# Patient Record
Sex: MALE | Race: WHITE | Employment: FULL TIME | URBAN - METROPOLITAN AREA
[De-identification: names, ages, dates, MRNs, and addresses within clinical notes are randomized per-mention and may not be internally consistent; named-entity substitution may affect disease eponyms.]

---

## 2021-03-05 ENCOUNTER — TELEPHONE (OUTPATIENT)
Dept: ORTHOPEDICS | Facility: CLINIC | Age: 36
End: 2021-03-05

## 2021-03-05 NOTE — TELEPHONE ENCOUNTER
Rec'd an email from Patient, requesting to see  for his knee problems.    No patient information on the incoming email.  No  or address.      Emailed pt back to contact orthopedic  to schedule or send email back w/, address and scheduling preferences

## 2021-06-02 ENCOUNTER — TELEPHONE (OUTPATIENT)
Dept: ORTHOPEDICS | Facility: CLINIC | Age: 36
End: 2021-06-02

## 2021-06-02 NOTE — TELEPHONE ENCOUNTER
Rec'd an email from patient stating that he rec'd a letter that said he needs to r/s his appointment with  on 7/13/21.  Writer is unsure why patient needs to reschedule.  Please contact patient via email and let him know the circumstances.

## 2021-06-03 NOTE — TELEPHONE ENCOUNTER
Patient was contacted via email that he can keep his appointment as is.      He was was also asked to provider where is records are located so that the clinic can gather his information before the appointment.

## 2021-06-07 NOTE — TELEPHONE ENCOUNTER
"Patient provider the following information.      \"Have you seen a provider for your left knee patellar issues before?  If yes:  Who have you seen and where did you see them? Dr. Kee with [facility].  Do you have any imaging on your knees? Not recently, this needs to be done.  Have you had surgery before on your knees? Yes, twice, with Dr. Kee, roughly 2000 and 2003.\"    He needed to reschedule his appointment and was offered and accepted an appointment with Dr. Glaser on 6/21/21  "

## 2021-06-16 NOTE — TELEPHONE ENCOUNTER
DIAGNOSIS: I have some challenges with my left knee and long term patella issues   APPOINTMENT DATE: 6.21.21   NOTES STATUS DETAILS   OFFICE NOTE from referring provider N/A    OFFICE NOTE from other specialist Sent to [facility] 6/24 8:17 AM [facility]   DISCHARGE SUMMARY from hospital N/A    DISCHARGE REPORT from the ER N/A    OPERATIVE REPORT In process 2003 2000   EMG report N/A    MEDICATION LIST N/A    MRI N/A    DEXA (osteoporosis/bone health) N/A    CT SCAN N/A    XRAYS (IMAGES & REPORTS) N/A      Action 6.17.21 9:57 AM [facility]   Action Taken Sent BILLIE to pt email at [email or web address]      Action 6.21.21 1:52 PM [facility]   Action Taken Brought RI to 4N for Debby to have pt sign. Sent BILLIE to [facility] [phone]

## 2021-06-18 DIAGNOSIS — M25.562 LEFT KNEE PAIN, UNSPECIFIED CHRONICITY: Primary | ICD-10-CM

## 2021-06-21 ENCOUNTER — PRE VISIT (OUTPATIENT)
Dept: ORTHOPEDICS | Facility: CLINIC | Age: 36
End: 2021-06-21

## 2021-06-21 ENCOUNTER — ANCILLARY PROCEDURE (OUTPATIENT)
Dept: GENERAL RADIOLOGY | Facility: CLINIC | Age: 36
End: 2021-06-21
Payer: COMMERCIAL

## 2021-06-21 ENCOUNTER — OFFICE VISIT (OUTPATIENT)
Dept: ORTHOPEDICS | Facility: CLINIC | Age: 36
End: 2021-06-21
Payer: COMMERCIAL

## 2021-06-21 DIAGNOSIS — M25.562 LEFT KNEE PAIN, UNSPECIFIED CHRONICITY: ICD-10-CM

## 2021-06-21 DIAGNOSIS — M25.362 PATELLAR INSTABILITY OF LEFT KNEE: Primary | ICD-10-CM

## 2021-06-21 PROCEDURE — 73562 X-RAY EXAM OF KNEE 3: CPT | Mod: LT | Performed by: RADIOLOGY

## 2021-06-21 PROCEDURE — 99204 OFFICE O/P NEW MOD 45 MIN: CPT | Mod: GC | Performed by: ORTHOPAEDIC SURGERY

## 2021-06-21 RX ORDER — FAMOTIDINE 20 MG/1
20 TABLET, FILM COATED ORAL DAILY
COMMUNITY
Start: 2020-11-24

## 2021-06-21 RX ORDER — ATORVASTATIN CALCIUM 20 MG/1
20 TABLET, FILM COATED ORAL AT BEDTIME
COMMUNITY
Start: 2021-03-14

## 2021-06-21 NOTE — PROGRESS NOTES
Patient seen and examined with the resident.     Assesment: patellar instability left knee, recurrent, 2 prior arthroscopic surgeries    Plan: mri of knee, f/u after    I agree with history, physical and imaging as well as the assessment and plan as detailed by Dr. Draper.

## 2021-06-21 NOTE — LETTER
Date:June 22, 2021      Patient was self referred, no letter generated. Do not send.        [facility] Health Information

## 2021-06-21 NOTE — PROGRESS NOTES
CHIEF CONCERN:   Chief Complaint   Patient presents with     Consult     left knee        HISTORY:   Marcelo Clark is a 36 year old male who presents to clinic today for evaluation of left knee pain. Pt states that he has had left patella instability in the past. He has had 6-7 instability episodes. His sister had surgery for this as well. He has had surgery to th left knee many years ago, he cannot recall the procedure. He most recently dislocated a few months ago playing golf. He is here to discuss surgical treatments.      PAST MEDICAL HISTORY: (Reviewed with the patient and in the [facility] medical record)  No past medical history on file.    PAST SURGICAL HISTORY: (Reviewed with the patient and in the [facility] medical record)  No past surgical history on file.    MEDICATIONS: (Reviewed with the patient and in the [facility] medical record)  Notable medications include:  Current Outpatient Medications   Medication Sig Dispense Refill     atorvastatin (LIPITOR) 20 MG tablet Take 20 mg by mouth At Bedtime       famotidine (PEPCID) 20 MG tablet Take 20 mg by mouth daily          ALLERGIES: (Reviewed with the patient and in the [facility] medical record)  No Known Allergies    SOCIAL HISTORY: (Reviewed with the patient and in the medical record)  Relationships   Social connections     Talks on phone: Not on file     Gets together: Not on file     Attends Moravian service: Not on file     Active member of club or organization: Not on file     Attends meetings of clubs or organizations: Not on file     Relationship status: Not on file        FAMILY HISTORY: (Reviewed with the patient and in the medical record)  -- No family history of bleeding, clotting, or difficulty with anesthesia    REVIEW OF SYSTEMS: (Reviewed with the patient and on the health intake form)  -- A comprehensive 10 point review of systems was conducted and is negative except as noted in the HPI    EXAM:   General: Awake, Alert and Oriented, No acute Distress.  Articulate and Interactive  There were no vitals taken for this visit.     Left lower extremity and knee exam:    Skin is Warm and Well perfused, no suggestion of infection    No effusion. No medial joint line pain. No lateral joint line pain. No other tenderness to palpation    Full range of motion, negative McMurrays    Stable to varus and valgus stress    Negative Lachmans, negative posterior drawer, negative pivot shift    Negative patellar tilt, negative patellar laxity, no J-sign present, + apprehension    Neurovascularly intact distally    IMAGING:  Plain Radiographs: Radiographs of the left knee from 6/21/2021 were independently reviewed by me and findings were discussed with the patient today. The imaging demonstrates possible trochlear dysplasia with apparent bump.    ASSESSMENT:  1. 36 year old male with left patella instability    PLAN:  1. The above diagnosis was discussed. he demonstrated understanding.  2. MRI for left knee  3. F/u after MRI    Jairon Draper MD   Fellow, Sports Medicine & Shoulder  [facility]

## 2021-06-21 NOTE — NURSING NOTE
Reason For Visit:   Chief Complaint   Patient presents with     Consult     left knee         ?  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  Work status?  Full time.  Date of injury: Chronic   Type of injury: Dislocation of patella.  Date of surgery: [facility] in [facility]   Type of surgery: .  Smoker: No  Request smoking cessation information: No         There were no vitals taken for this visit.       No Known Allergies    Current Outpatient Medications   Medication     atorvastatin (LIPITOR) 20 MG tablet     famotidine (PEPCID) 20 MG tablet     No current facility-administered medications for this visit.          Debby Juarez, ATC

## 2021-06-21 NOTE — LETTER
6/21/2021         RE: Marcelo Clark  [address] [address]        Dear Colleague,    Thank you for referring your patient, Marcelo Clark, to the [facility] [place]. Please see a copy of my visit note below.    CHIEF CONCERN:   Chief Complaint   Patient presents with     Consult     left knee        HISTORY:   Marcelo Clark is a 36 year old male who presents to clinic today for evaluation of left knee pain. Pt states that he has had left patella instability in the past. He has had 6-7 instability episodes. His sister had surgery for this as well. He has had surgery to th left knee many years ago, he cannot recall the procedure. He most recently dislocated a few months ago playing golf. He is here to discuss surgical treatments.      PAST MEDICAL HISTORY: (Reviewed with the patient and in the [facility] medical record)  No past medical history on file.    PAST SURGICAL HISTORY: (Reviewed with the patient and in the [facility] medical record)  No past surgical history on file.    MEDICATIONS: (Reviewed with the patient and in the [facility] medical record)  Notable medications include:  Current Outpatient Medications   Medication Sig Dispense Refill     atorvastatin (LIPITOR) 20 MG tablet Take 20 mg by mouth At Bedtime       famotidine (PEPCID) 20 MG tablet Take 20 mg by mouth daily          ALLERGIES: (Reviewed with the patient and in the [facility] medical record)  No Known Allergies    SOCIAL HISTORY: (Reviewed with the patient and in the medical record)  Relationships   Social connections     Talks on phone: Not on file     Gets together: Not on file     Attends Shinto service: Not on file     Active member of club or organization: Not on file     Attends meetings of clubs or organizations: Not on file     Relationship status: Not on file        FAMILY HISTORY: (Reviewed with the patient and in the medical record)  -- No family history of bleeding, clotting, or difficulty  with anesthesia    REVIEW OF SYSTEMS: (Reviewed with the patient and on the health intake form)  -- A comprehensive 10 point review of systems was conducted and is negative except as noted in the HPI    EXAM:   General: Awake, Alert and Oriented, No acute Distress. Articulate and Interactive  There were no vitals taken for this visit.     Left lower extremity and knee exam:    Skin is Warm and Well perfused, no suggestion of infection    No effusion. No medial joint line pain. No lateral joint line pain. No other tenderness to palpation    Full range of motion, negative McMurrays    Stable to varus and valgus stress    Negative Lachmans, negative posterior drawer, negative pivot shift    Negative patellar tilt, negative patellar laxity, no J-sign present, + apprehension    Neurovascularly intact distally    IMAGING:  Plain Radiographs: Radiographs of the left knee from 6/21/2021 were independently reviewed by me and findings were discussed with the patient today. The imaging demonstrates possible trochlear dysplasia with apparent bump.    ASSESSMENT:  1. 36 year old male with left patella instability    PLAN:  1. The above diagnosis was discussed. he demonstrated understanding.  2. MRI for left knee  3. F/u after MRI    Jairon Draper MD   Fellow, Sports Medicine & Shoulder  [facility]opaedic Surgery     Patient seen and examined with the resident.     Assesment: patellar instability left knee, recurrent, 2 prior arthroscopic surgeries    Plan: mri of knee, f/u after    I agree with history, physical and imaging as well as the assessment and plan as detailed by Dr. Draper.         Again, thank you for allowing me to participate in the care of your patient.        Sincerely,        Marcelo Glaser MD

## 2021-07-02 ENCOUNTER — TELEPHONE (OUTPATIENT)
Dept: ORTHOPEDICS | Facility: CLINIC | Age: 36
End: 2021-07-02

## 2021-07-02 DIAGNOSIS — M25.362 PATELLAR INSTABILITY OF LEFT KNEE: Primary | ICD-10-CM

## 2021-07-02 RX ORDER — DICLOFENAC SODIUM 75 MG/1
75 TABLET, DELAYED RELEASE ORAL 2 TIMES DAILY
Qty: 28 TABLET | Refills: 0 | Status: SHIPPED | OUTPATIENT
Start: 2021-07-02

## 2021-07-02 NOTE — TELEPHONE ENCOUNTER
Marcelo Glaser MD Wobschall, Tricia A, MA; Diana Godwin, BEST; Elaine Peters, ATC; Lori Woodard, ATC             Please reach out to the patient and explain that his MRI has been rejected by his insurance company because he has not completed the reasons listed below.     We will not be able to get around this restriction, so Please ask the patient if he has done PT or been under the care of a physician x 6 weeks. If yes, then I will do a peer to peer. If no, then he needs to complete first -     Please order PT as well as PO diclofenac 75 mg po bid x 2 weeks, then prn. Then arrange a followup visit at 6 weeks (ok for phone) to discuss the results.                 Pt was called called by RN and voicemail left regarding Dr Glaser's above plan.  Rx for diclofenac was sent to pt's pharmacy on record.  P.T order was entered.

## 2021-07-05 ENCOUNTER — TELEPHONE (OUTPATIENT)
Dept: ORTHOPEDICS | Facility: CLINIC | Age: 36
End: 2021-07-05

## 2021-07-05 NOTE — TELEPHONE ENCOUNTER
[facility] Call Center    Phone Message    May a detailed message be left on voicemail: yes     Reason for Call: Other: Pt is returning call from Dr Glaser's team. Previously, pt's last PT was in 2004, and pt believes that PT will not help but instead will cause more pain.   Please call back to discuss.    Action Taken: Message routed to:  [facility] ortho    Travel Screening: Not Applicable

## 2021-07-05 NOTE — TELEPHONE ENCOUNTER
I left a message for the patient regarding his MRI denial. I let him know that he will need to complete 6 weeks of PT prior to getting the MRI and if he has completed this somewhere outside of our system to please call us back to let us know. Otherwise a PT order has been placed and he can start that at anytime. A call back number was given.     LUPILLO Henry

## 2021-07-05 NOTE — TELEPHONE ENCOUNTER
Writer called and talked with pt on the phone. Pt states that prior to switching care over to [facility] another MRI had been ordered and approved, so pt does not understand why would need to do physical therapy. Writer informed pt that they should call their medical insurance to see why MRI is denied and if physical therapy is necessary and or if would be covered. Patient will call clinic back once as an answer.     Radha Purcell LPN

## 2021-07-11 ENCOUNTER — HEALTH MAINTENANCE LETTER (OUTPATIENT)
Age: 36
End: 2021-07-11

## 2021-09-05 ENCOUNTER — HEALTH MAINTENANCE LETTER (OUTPATIENT)
Age: 36
End: 2021-09-05

## 2021-10-18 ENCOUNTER — MYC MEDICAL ADVICE (OUTPATIENT)
Dept: ORTHOPEDICS | Facility: CLINIC | Age: 36
End: 2021-10-18
Payer: COMMERCIAL

## 2021-12-02 ENCOUNTER — ANCILLARY PROCEDURE (OUTPATIENT)
Dept: MRI IMAGING | Facility: CLINIC | Age: 36
End: 2021-12-02
Attending: ORTHOPAEDIC SURGERY
Payer: COMMERCIAL

## 2021-12-02 DIAGNOSIS — M25.362 PATELLAR INSTABILITY OF LEFT KNEE: ICD-10-CM

## 2021-12-02 PROCEDURE — 73721 MRI JNT OF LWR EXTRE W/O DYE: CPT | Mod: LT | Performed by: RADIOLOGY

## 2021-12-13 ENCOUNTER — OFFICE VISIT (OUTPATIENT)
Dept: ORTHOPEDICS | Facility: CLINIC | Age: 36
End: 2021-12-13
Payer: COMMERCIAL

## 2021-12-13 VITALS — WEIGHT: 215 LBS | BODY MASS INDEX: 29.12 KG/M2 | HEIGHT: 72 IN

## 2021-12-13 DIAGNOSIS — G89.29 CHRONIC PAIN OF LEFT KNEE: Primary | ICD-10-CM

## 2021-12-13 DIAGNOSIS — M25.562 CHRONIC PAIN OF LEFT KNEE: Primary | ICD-10-CM

## 2021-12-13 PROCEDURE — 99213 OFFICE O/P EST LOW 20 MIN: CPT | Mod: GC | Performed by: ORTHOPAEDIC SURGERY

## 2021-12-13 ASSESSMENT — MIFFLIN-ST. JEOR: SCORE: 1933.23

## 2021-12-13 NOTE — LETTER
Date:December 15, 2021      Patient was self referred, no letter generated. Do not send.        [facility] Health Information

## 2021-12-13 NOTE — PROGRESS NOTES
Patient seen and examined with the Fellow.     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  Improved following her visit in June of this year    Plan: Long discussion with the patient.  I showed him his images.  I discussed his underlying diagnosis of trochlear dysplasia and how this contributes to patellar instability.  I also discussed zee with him his new diagnosis of patellofemoral arthritis.  I discussed with him the treatment options including nonsurgical and surgical treatment.  We reviewed the potential expected course of recovery.  At this time he is actually feeling much better.  He is in a continue to do things that he wants to do.  Keep his muscle strong, keep his joints moving.  He has an offer for us for corticosteroid injection whenever needed.  He also has an offer for a prescription strength anti-inflammatory as necessary.  He will follow-up with me on an as-needed basis.    I agree with history, physical and imaging as well as the assessment and plan as detailed by Dr. Villalobos.

## 2021-12-13 NOTE — PROGRESS NOTES
DIAGNOSIS:   1. Left knee pain/instability    PROCEDURES:  1. None with us; two prior left knee arthroscopic procedures 20+ years prior.  Patient believes these were clean up type procedures.    HISTORY:  Briefly, this is a 36 year old male with a history significant for multiple (7+) episodes of patellar instability.  Since his last visit he worked with PT and has been working on strengthening on his own with the [employer].  He has not had an instability episode in over a year.  He has not had significant pain lately.  Admittedly, he has not played hockey or gone skiing as it just recently snowed for the first time.    Of note his sister also sustained multiple patellar dislocations and underwent two surgeries with Dr. Austin in 2019.    EXAM:     General: Awake, Alert, and oriented. Articulates and communicates with a normal affect     Left Lower Extremity:    Incisions well healed without evidence of infection    Stable with Lachman, anterior and posterior drawer    Negative Carlos's    Good range of motion, 0-135    Mild patellar apprehension with lateral translation    Good strength with straight leg testing    Neurovascularly intact    IMAGING:  MRI left knee without contrast obtained on 12/2/21 and reviewed today 12/13/21 demonstrates:  -Slight patella ho  -Trochlear dysplasia  -Questionably increased TTTG at 19-20  -Chondral lesion on patella and lateral trochlea with underlying cyst formation    ASSESSMENT:  1. Left knee patellar instability with aforementioned risk factors  2. Chondral lesions on patella and lateral trochlea    PLAN:   We discussed the diagnosis, prognosis, and treatment options.  We discussed non-operative versus operative intervention.    Non-operative intervention would consist of continued strengthening, injections, over the counter or prescription strength medications.    We discussed that he would also be a candidate for operative intervention.  However, as he is doing fairly well  with regard to symptoms, he would like to continue with non-operative management at this time.  He understands the treatment options as well as the diagnosis.  All of his questions were answered.  He will follow up on an as needed basis.    The patient was seen and examined with Dr. Glaser, who agrees with the assessment and plan as above.

## 2021-12-13 NOTE — LETTER
12/13/2021         RE: Marcelo Clark  [address] [address]        Dear Colleague,    Thank you for referring your patient, Marcelo Clark, to the [facility] [place]. Please see a copy of my visit note below.    Patient seen and examined with the Fellow.     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  Improved following her visit in June of this year    Plan: Long discussion with the patient.  I showed him his images.  I discussed his underlying diagnosis of trochlear dysplasia and how this contributes to patellar instability.  I also discussed zee with him his new diagnosis of patellofemoral arthritis.  I discussed with him the treatment options including nonsurgical and surgical treatment.  We reviewed the potential expected course of recovery.  At this time he is actually feeling much better.  He is in a continue to do things that he wants to do.  Keep his muscle strong, keep his joints moving.  He has an offer for us for corticosteroid injection whenever needed.  He also has an offer for a prescription strength anti-inflammatory as necessary.  He will follow-up with me on an as-needed basis.    I agree with history, physical and imaging as well as the assessment and plan as detailed by Dr. Villalobos.       DIAGNOSIS:   1. Left knee pain/instability    PROCEDURES:  1. None with us; two prior left knee arthroscopic procedures 20+ years prior.  Patient believes these were clean up type procedures.    HISTORY:  Briefly, this is a 36 year old male with a history significant for multiple (7+) episodes of patellar instability.  Since his last visit he worked with PT and has been working on strengthening on his own with the [employer].  He has not had an instability episode in over a year.  He has not had significant pain lately.   Admittedly, he has not played hockey or gone skiing as it just recently snowed for the first time.    Of note his sister also sustained multiple patellar dislocations and underwent two surgeries with Dr. Austin in 2019.    EXAM:     General: Awake, Alert, and oriented. Articulates and communicates with a normal affect     Left Lower Extremity:    Incisions well healed without evidence of infection    Stable with Lachman, anterior and posterior drawer    Negative Carlos's    Good range of motion, 0-135    Mild patellar apprehension with lateral translation    Good strength with straight leg testing    Neurovascularly intact    IMAGING:  MRI left knee without contrast obtained on 12/2/21 and reviewed today 12/13/21 demonstrates:  -Slight patella ho  -Trochlear dysplasia  -Questionably increased TTTG at 19-20  -Chondral lesion on patella and lateral trochlea with underlying cyst formation    ASSESSMENT:  1. Left knee patellar instability with aforementioned risk factors  2. Chondral lesions on patella and lateral trochlea    PLAN:   We discussed the diagnosis, prognosis, and treatment options.  We discussed non-operative versus operative intervention.    Non-operative intervention would consist of continued strengthening, injections, over the counter or prescription strength medications.    We discussed that he would also be a candidate for operative intervention.  However, as he is doing fairly well with regard to symptoms, he would like to continue with non-operative management at this time.  He understands the treatment options as well as the diagnosis.  All of his questions were answered.  He will follow up on an as needed basis.    The patient was seen and examined with Dr. Glaser, who agrees with the assessment and plan as above.         Again, thank you for allowing me to participate in the care of your patient.        Sincerely,        Marcelo Glaser MD

## 2022-08-07 ENCOUNTER — HEALTH MAINTENANCE LETTER (OUTPATIENT)
Age: 37
End: 2022-08-07

## 2022-10-23 ENCOUNTER — HEALTH MAINTENANCE LETTER (OUTPATIENT)
Age: 37
End: 2022-10-23

## 2023-02-03 ENCOUNTER — TELEPHONE (OUTPATIENT)
Dept: ORTHOPEDICS | Facility: CLINIC | Age: 38
End: 2023-02-03
Payer: COMMERCIAL

## 2023-02-09 DIAGNOSIS — G89.29 CHRONIC PAIN OF LEFT KNEE: Primary | ICD-10-CM

## 2023-02-09 DIAGNOSIS — M25.562 CHRONIC PAIN OF LEFT KNEE: Primary | ICD-10-CM

## 2023-02-13 ENCOUNTER — OFFICE VISIT (OUTPATIENT)
Dept: ORTHOPEDICS | Facility: CLINIC | Age: 38
End: 2023-02-13
Payer: COMMERCIAL

## 2023-02-13 ENCOUNTER — ANCILLARY PROCEDURE (OUTPATIENT)
Dept: GENERAL RADIOLOGY | Facility: CLINIC | Age: 38
End: 2023-02-13
Attending: ORTHOPAEDIC SURGERY
Payer: COMMERCIAL

## 2023-02-13 DIAGNOSIS — M25.562 CHRONIC PAIN OF LEFT KNEE: Primary | ICD-10-CM

## 2023-02-13 DIAGNOSIS — G89.29 CHRONIC PAIN OF LEFT KNEE: ICD-10-CM

## 2023-02-13 DIAGNOSIS — G89.29 CHRONIC PAIN OF LEFT KNEE: Primary | ICD-10-CM

## 2023-02-13 DIAGNOSIS — M25.562 CHRONIC PAIN OF LEFT KNEE: ICD-10-CM

## 2023-02-13 PROCEDURE — 20610 DRAIN/INJ JOINT/BURSA W/O US: CPT | Mod: LT | Performed by: ORTHOPAEDIC SURGERY

## 2023-02-13 PROCEDURE — 99214 OFFICE O/P EST MOD 30 MIN: CPT | Mod: GC | Performed by: ORTHOPAEDIC SURGERY

## 2023-02-13 PROCEDURE — 73562 X-RAY EXAM OF KNEE 3: CPT | Mod: LT | Performed by: RADIOLOGY

## 2023-02-13 RX ORDER — TRIAMCINOLONE ACETONIDE 40 MG/ML
40 INJECTION, SUSPENSION INTRA-ARTICULAR; INTRAMUSCULAR
Status: SHIPPED | OUTPATIENT
Start: 2023-02-13

## 2023-02-13 RX ORDER — LIDOCAINE HYDROCHLORIDE 5 MG/ML
8 INJECTION, SOLUTION INFILTRATION; INTRAVENOUS
Status: SHIPPED | OUTPATIENT
Start: 2023-02-13

## 2023-02-13 RX ORDER — ROSUVASTATIN CALCIUM 10 MG/1
10 TABLET, COATED ORAL DAILY
COMMUNITY
Start: 2023-01-11

## 2023-02-13 RX ORDER — DICLOFENAC SODIUM 75 MG/1
75 TABLET, DELAYED RELEASE ORAL 2 TIMES DAILY
Qty: 60 TABLET | Refills: 0 | Status: SHIPPED | OUTPATIENT
Start: 2023-02-13

## 2023-02-13 RX ADMIN — TRIAMCINOLONE ACETONIDE 40 MG: 40 INJECTION, SUSPENSION INTRA-ARTICULAR; INTRAMUSCULAR at 13:23

## 2023-02-13 RX ADMIN — LIDOCAINE HYDROCHLORIDE 8 ML: 5 INJECTION, SOLUTION INFILTRATION; INTRAVENOUS at 13:23

## 2023-02-13 NOTE — PROGRESS NOTES
Patient seen and examined with the resident. I also personally reviewed the images and interpreted the imaging myself.     Assesment: High-grade patella femoral chondrosis aggravated by recent episode while alpine skiing    Plan: I had a long discussion today with the patient.  I reviewed the diagnosis and the potential treatment options.  We discussed the utilization of a corticosteroid injection to reset the anterior workings of his knee and try to improve his symptoms.  He thinks this is a good choices overall he was doing very well into this episode while skiing a few weeks ago.  In addition I will call him in a prescription for oral diclofenac to use on an as-needed basis and explained its use to him.    He fails the oral diclofenac and corticosteroid injections we could consider arthroscopic/open surgery for treatment of his high-grade patellofemoral chondrosis with that said I think it behooves us to exhaust all nonsurgical things first    After written informed consent obtained and signed, after sufficient prepping and sterile technique, 40 mg of kenalog and , 8 cc of 1% lidocaine were injected without complication into the left knee. The patient tolerated the injection well and a sterile dressing was applied.       I agree with history, physical and imaging as well as the assessment and plan as detailed by Dr. Culvre.

## 2023-02-13 NOTE — LETTER
2/13/2023         RE: Marcelo Clark  [address] [facility] [address]        Dear Colleague,    Thank you for referring your patient, Marcelo Clark, to the [facility] [place]. Please see a copy of my visit note below.    DIAGNOSIS:   1. Left knee pain  2. Left patellar instability    PROCEDURES:  1. None with us; two prior left knee arthroscopic procedures 20+ years prior.  Patient believes these were clean up type procedures.    HISTORY:  Marcelo Clark is a 37-year-old gentleman who presents to clinic today to discuss his chronic left knee pain.  Patient was last evaluated in orthopedic clinic on 12/13/2021.  At that visit, after discussion, patient elected to proceed with nonoperative management.  Patient reports that he was doing well with his nonoperative management, and has been quite proud of his ability to continue his physical therapy exercises.  He states that overall he had been able to participate in his desired extracurricular activities until a recent ski injury on New Year's Day.  On New Year's Day, patient reports that he was skiing with his daughter, and thought he had clipped into both of his skis however, was not clipped into his right ski which led to his legs going in opposite directions, and resulted in a partial dislocation of his left knee.  He denies any injury to his right knee.  Since his injury on New Year's Day, he states that his pain has improved, and is now more similar to his baseline dull ache.  Patient has been unable to return to all of his desired activities due to concern for aggravated left knee pain.  Patient recalled his prior evaluation, and the discussion of utilization of an intra-articular injection evaluation.  Patient has had 1 prior intra-articular injection which she reports was partially beneficial.  He is unable to recall the exact timeframe of his prior injection however, believes it occurred sometime in 2018.    He  presents to clinic today to discuss his current level of symptomatology, and the available treatment options.    EXAM:     General: Awake, Alert, and oriented. Articulates and communicates with a normal affect     Left lower Extremity:    Apprehension with laterally directed patellar force    2 quadrants medial, and 2+ quadrants lateral    Knee ROM = 0 to 135 degrees flexion    Able to complete a straight leg raise    Stable to Lachman    Stable to anterior/posterior drawer stress testing, and valgus/varus stress testing    5/5 strength EHL, FHL, TA, GSC, quads, hamstrings    Sensation intact light touch throughout sural, saphenous, tibial, SP, and DP nerve distributions    Warm and well-perfused    IMAGING:  Radiographs of the left knee from 2/13/2023 were independently reviewed by me and findings were discussed with the patient today. The imaging demonstrates no acute osseous abnormality.  Evidence of advancement of patient's known patellofemoral compartment arthritis as suggested by patient's decreased joint space.  Mild advancement of patient's bilateral medial tibiofemoral compartment osteoarthritis as indicated by decreased joint space.  Mild advancement of patient's bilateral lateral tibiofemoral compartment osteoarthritis as indicated by decreased joint space, and increased meniscal calcinosis.    ASSESSMENT:  1. Left knee pain  2. Left patellar instability    Discussed with patient their underlying the diagnosis.  Patient is very aware of his diagnosis, and the associated natural history.  Overall, patient has been pleased with how he has been managing nonoperatively.  Discussed the available nonoperative management options including continued physical therapy, utilization of a prescription strength anti-inflammatory, and a trial of intra-articular corticosteroid injection.  Reviewed that the intra-articular corticosteroid injection could be utilized to help patient recover from his most recent  exacerbation, and a prescription strength anti-inflammatory could be provided for patient to utilize during episodes of increased activity.  After discussion of the available nonoperative treatment options, patient elected to proceed with an intra-articular corticosteroid injection today, and obtain a prescription for diclofenac to take as needed.    PLAN:   Prescription for diclofenac sent to patient's desired pharmacy  Patient received intra-articular corticosteroid injection to the left knee without incident    Patient will follow-up as needed    And acknowledged understanding of the above care plan; no additional questions or concerns at this point time.    This patient was discussed and evaluated with Dr. Glaser who is in agreement with the above care plan.    Vishal Culver MD  Orthopaedic Surgery, PGY-5      Large Joint Injection: L knee joint    Date/Time: 2/13/2023 1:23 PM  Performed by: Marcelo Glaser MD  Authorized by: Marcelo Glaser MD     Indications:  Pain and osteoarthritis  Needle Size:  22 G  Guidance: landmark guided    Approach:  Anterolateral  Location:  Knee      Medications:  40 mg triamcinolone 40 MG/ML; 8 mL lidocaine (PF) 0.5 %  Outcome:  Tolerated well, no immediate complications  Procedure discussed: discussed risks, benefits, and alternatives    Consent Given by:  Patient  Timeout: timeout called immediately prior to procedure    Prep: patient was prepped and draped in usual sterile fashion            Patient seen and examined with the resident. I also personally reviewed the images and interpreted the imaging myself.     Assesment: High-grade patella femoral chondrosis aggravated by recent episode while alpine skiing    Plan: I had a long discussion today with the patient.  I reviewed the diagnosis and the potential treatment options.  We discussed the utilization of a corticosteroid injection to reset the anterior workings of his knee and try to improve  his symptoms.  He thinks this is a good choices overall he was doing very well into this episode while skiing a few weeks ago.  In addition I will call him in a prescription for oral diclofenac to use on an as-needed basis and explained its use to him.    He fails the oral diclofenac and corticosteroid injections we could consider arthroscopic/open surgery for treatment of his high-grade patellofemoral chondrosis with that said I think it behooves us to exhaust all nonsurgical things first    After written informed consent obtained and signed, after sufficient prepping and sterile technique, 40 mg of kenalog and , 8 cc of 1% lidocaine were injected without complication into the left knee. The patient tolerated the injection well and a sterile dressing was applied.       I agree with history, physical and imaging as well as the assessment and plan as detailed by Dr. Culver.         Again, thank you for allowing me to participate in the care of your patient.        Sincerely,        Marcelo Glaser MD

## 2023-02-13 NOTE — LETTER
Date:February 14, 2023      Patient was self referred, no letter generated. Do not send.        [facility] Health Information

## 2023-02-13 NOTE — PROGRESS NOTES
DIAGNOSIS:   1. Left knee pain  2. Left patellar instability    PROCEDURES:  1. None with us; two prior left knee arthroscopic procedures 20+ years prior.  Patient believes these were clean up type procedures.    HISTORY:  Marcelo Clark is a 37-year-old gentleman who presents to clinic today to discuss his chronic left knee pain.  Patient was last evaluated in orthopedic clinic on 12/13/2021.  At that visit, after discussion, patient elected to proceed with nonoperative management.  Patient reports that he was doing well with his nonoperative management, and has been quite proud of his ability to continue his physical therapy exercises.  He states that overall he had been able to participate in his desired extracurricular activities until a recent ski injury on New Year's Day.  On New Year's Day, patient reports that he was skiing with his daughter, and thought he had clipped into both of his skis however, was not clipped into his right ski which led to his legs going in opposite directions, and resulted in a partial dislocation of his left knee.  He denies any injury to his right knee.  Since his injury on New Year's Day, he states that his pain has improved, and is now more similar to his baseline dull ache.  Patient has been unable to return to all of his desired activities due to concern for aggravated left knee pain.  Patient recalled his prior evaluation, and the discussion of utilization of an intra-articular injection evaluation.  Patient has had 1 prior intra-articular injection which she reports was partially beneficial.  He is unable to recall the exact timeframe of his prior injection however, believes it occurred sometime in 2018.    He presents to clinic today to discuss his current level of symptomatology, and the available treatment options.    EXAM:     General: Awake, Alert, and oriented. Articulates and communicates with a normal affect     Left lower Extremity:    Apprehension with laterally  directed patellar force    2 quadrants medial, and 2+ quadrants lateral    Knee ROM = 0 to 135 degrees flexion    Able to complete a straight leg raise    Stable to Lachman    Stable to anterior/posterior drawer stress testing, and valgus/varus stress testing    5/5 strength EHL, FHL, TA, GSC, quads, hamstrings    Sensation intact light touch throughout sural, saphenous, tibial, SP, and DP nerve distributions    Warm and well-perfused    IMAGING:  Radiographs of the left knee from 2/13/2023 were independently reviewed by me and findings were discussed with the patient today. The imaging demonstrates no acute osseous abnormality.  Evidence of advancement of patient's known patellofemoral compartment arthritis as suggested by patient's decreased joint space.  Mild advancement of patient's bilateral medial tibiofemoral compartment osteoarthritis as indicated by decreased joint space.  Mild advancement of patient's bilateral lateral tibiofemoral compartment osteoarthritis as indicated by decreased joint space, and increased meniscal calcinosis.    ASSESSMENT:  1. Left knee pain  2. Left patellar instability    Discussed with patient their underlying the diagnosis.  Patient is very aware of his diagnosis, and the associated natural history.  Overall, patient has been pleased with how he has been managing nonoperatively.  Discussed the available nonoperative management options including continued physical therapy, utilization of a prescription strength anti-inflammatory, and a trial of intra-articular corticosteroid injection.  Reviewed that the intra-articular corticosteroid injection could be utilized to help patient recover from his most recent exacerbation, and a prescription strength anti-inflammatory could be provided for patient to utilize during episodes of increased activity.  After discussion of the available nonoperative treatment options, patient elected to proceed with an intra-articular corticosteroid  injection today, and obtain a prescription for diclofenac to take as needed.    PLAN:   Prescription for diclofenac sent to patient's desired pharmacy  Patient received intra-articular corticosteroid injection to the left knee without incident    Patient will follow-up as needed    And acknowledged understanding of the above care plan; no additional questions or concerns at this point time.    This patient was discussed and evaluated with Dr. Glaser who is in agreement with the above care plan.    Vishal Culver MD  Orthopaedic Surgery, PGY-5      Large Joint Injection: L knee joint    Date/Time: 2/13/2023 1:23 PM  Performed by: Marcelo Glaser MD  Authorized by: Marcelo Glaser MD     Indications:  Pain and osteoarthritis  Needle Size:  22 G  Guidance: landmark guided    Approach:  Anterolateral  Location:  Knee      Medications:  40 mg triamcinolone 40 MG/ML; 8 mL lidocaine (PF) 0.5 %  Outcome:  Tolerated well, no immediate complications  Procedure discussed: discussed risks, benefits, and alternatives    Consent Given by:  Patient  Timeout: timeout called immediately prior to procedure    Prep: patient was prepped and draped in usual sterile fashion

## 2023-02-13 NOTE — NURSING NOTE
[address] [address]  Dept: [phone]  ______________________________________________________________________________    Patient: Marcelo Clark   : 1985   MRN: [id number]   2023    INVASIVE PROCEDURE SAFETY CHECKLIST    Date: 2023   Procedure: left knee joint injection with kenalog  Patient Name: Marcelo Clark  MRN: [id number]  YOB: 1985    Action: Complete sections as appropriate. Any discrepancy results in a HARD COPY until resolved.     PRE PROCEDURE:  Patient ID verified with 2 identifiers (name and  or MRN): Yes  Procedure and site verified with patient/designee (when able): Yes  Accurate consent documentation in medical record: Yes  H&P (or appropriate assessment) documented in medical record: Yes  H&P must be up to 20 days prior to procedure and updates within 24 hours of procedure as applicable: NA  Relevant diagnostic and radiology test results appropriately labeled and displayed as applicable: NA  Procedure site(s) marked with provider initials: NA    TIMEOUT:  Time-Out performed immediately prior to starting procedure, including verbal and active participation of all team members addressing the following:Yes  * Correct patient identify  * Confirmed that the correct side and site are marked  * An accurate procedure consent form  * Agreement on the procedure to be done  * Correct patient position  * Relevant images and results are properly labeled and appropriately displayed  * The need to administer antibiotics or fluids for irrigation purposes during the procedure as applicable   * Safety precautions based on patient history or medication use    DURING PROCEDURE: Verification of correct person, site, and procedures any time the responsibility for care of the patient is transferred to another member of the care team.       Prior to injection, verified patient identity using patient's  name and date of birth.  Due to injection administration, patient instructed to remain in clinic for 15 minutes  afterwards, and to report any adverse reaction to me immediately.    Joint injection was performed.      Lido  Drug Amount Wasted:  Yes: 42 mg/ml   Vial/Syringe: Single dose vial  Expiration Date:  02/01/2024    Kenalog  Drug Amount Wasted: No  Vial/Syringe: Single dose vial  Expiration Date:  09/01/2024    Lori Woodard, ATC  February 13, 2023

## 2023-02-13 NOTE — NURSING NOTE
Reason For Visit:   Chief Complaint   Patient presents with     Left Knee - RECHECK         ?  No    Date of injury: 1/1/2023  Type of injury: reports he thought he was clipped into his alpine skis, he went to push  off while alpine skiing, his left ski got caught.     Date of surgery: None  Type of surgery: NA.    Patient was last seen on 12/13/2021.He continued with physical therapy and HEP which he has found to be help.  He does report he recent injury on 1/1/2023. He reports his main symptoms is pain. He was able to continue to ski that day. He will wear a knee brace for activity.     SANE Score  Left Knee: 60%  Right Knee: 100%      There were no vitals taken for this visit.       No Known Allergies    Current Outpatient Medications   Medication     atorvastatin (LIPITOR) 20 MG tablet     diclofenac (VOLTAREN) 75 MG EC tablet     famotidine (PEPCID) 20 MG tablet     No current facility-administered medications for this visit.         Lori Woodard, ATC

## 2023-08-27 ENCOUNTER — HEALTH MAINTENANCE LETTER (OUTPATIENT)
Age: 38
End: 2023-08-27

## 2024-10-20 ENCOUNTER — HEALTH MAINTENANCE LETTER (OUTPATIENT)
Age: 39
End: 2024-10-20

## (undated) RX ORDER — LIDOCAINE HYDROCHLORIDE 5 MG/ML
INJECTION, SOLUTION INFILTRATION; INTRAVENOUS
Status: DISPENSED
Start: 2023-02-13

## (undated) RX ORDER — TRIAMCINOLONE ACETONIDE 40 MG/ML
INJECTION, SUSPENSION INTRA-ARTICULAR; INTRAMUSCULAR
Status: DISPENSED
Start: 2023-02-13